# Patient Record
Sex: MALE | Race: WHITE | NOT HISPANIC OR LATINO | URBAN - METROPOLITAN AREA
[De-identification: names, ages, dates, MRNs, and addresses within clinical notes are randomized per-mention and may not be internally consistent; named-entity substitution may affect disease eponyms.]

---

## 2022-02-13 ENCOUNTER — EMERGENCY (EMERGENCY)
Facility: HOSPITAL | Age: 52
LOS: 0 days | Discharge: HOME | End: 2022-02-13
Attending: EMERGENCY MEDICINE
Payer: MEDICARE

## 2022-02-13 VITALS
OXYGEN SATURATION: 95 % | HEART RATE: 86 BPM | DIASTOLIC BLOOD PRESSURE: 76 MMHG | TEMPERATURE: 98 F | SYSTOLIC BLOOD PRESSURE: 154 MMHG | RESPIRATION RATE: 16 BRPM

## 2022-02-13 VITALS — WEIGHT: 205.03 LBS | HEIGHT: 71 IN

## 2022-02-13 DIAGNOSIS — S61.312A LACERATION WITHOUT FOREIGN BODY OF RIGHT MIDDLE FINGER WITH DAMAGE TO NAIL, INITIAL ENCOUNTER: ICD-10-CM

## 2022-02-13 DIAGNOSIS — S62.632A DISPLACED FRACTURE OF DISTAL PHALANX OF RIGHT MIDDLE FINGER, INITIAL ENCOUNTER FOR CLOSED FRACTURE: ICD-10-CM

## 2022-02-13 DIAGNOSIS — Z23 ENCOUNTER FOR IMMUNIZATION: ICD-10-CM
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
## 2022-02-13 DIAGNOSIS — Z87.820 PERSONAL HISTORY OF TRAUMATIC BRAIN INJURY: ICD-10-CM
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
## 2022-02-13 DIAGNOSIS — S69.91XA UNSPECIFIED INJURY OF RIGHT WRIST, HAND AND FINGER(S), INITIAL ENCOUNTER: ICD-10-CM

## 2022-02-13 DIAGNOSIS — M79.644 PAIN IN RIGHT FINGER(S): ICD-10-CM

## 2022-02-13 PROCEDURE — 90471 IMMUNIZATION ADMIN: CPT

## 2022-02-13 PROCEDURE — 96374 THER/PROPH/DIAG INJ IV PUSH: CPT | Mod: XU

## 2022-02-13 PROCEDURE — 90715 TDAP VACCINE 7 YRS/> IM: CPT

## 2022-02-13 PROCEDURE — 99284 EMERGENCY DEPT VISIT MOD MDM: CPT

## 2022-02-13 PROCEDURE — 11760 REPAIR OF NAIL BED: CPT | Mod: F7

## 2022-02-13 PROCEDURE — 99284 EMERGENCY DEPT VISIT MOD MDM: CPT | Mod: 25

## 2022-02-13 PROCEDURE — 73130 X-RAY EXAM OF HAND: CPT | Mod: RT

## 2022-02-13 PROCEDURE — 73130 X-RAY EXAM OF HAND: CPT | Mod: 26,RT

## 2022-02-13 RX ORDER — CEFAZOLIN SODIUM 1 G
1000 VIAL (EA) INJECTION ONCE
Refills: 0 | Status: COMPLETED | OUTPATIENT
Start: 2022-02-13 | End: 2022-02-13

## 2022-02-13 RX ORDER — SODIUM CHLORIDE 9 MG/ML
1000 INJECTION INTRAMUSCULAR; INTRAVENOUS; SUBCUTANEOUS ONCE
Refills: 0 | Status: COMPLETED | OUTPATIENT
Start: 2022-02-13 | End: 2022-02-13

## 2022-02-13 RX ORDER — OXYCODONE HYDROCHLORIDE 5 MG/1
10 TABLET ORAL ONCE
Refills: 0 | Status: DISCONTINUED | OUTPATIENT
Start: 2022-02-13 | End: 2022-02-13

## 2022-02-13 RX ORDER — CEPHALEXIN 500 MG
1 CAPSULE ORAL
Qty: 20 | Refills: 0
Start: 2022-02-13 | End: 2022-02-17

## 2022-02-13 RX ORDER — TETANUS TOXOID, REDUCED DIPHTHERIA TOXOID AND ACELLULAR PERTUSSIS VACCINE, ADSORBED 5; 2.5; 8; 8; 2.5 [IU]/.5ML; [IU]/.5ML; UG/.5ML; UG/.5ML; UG/.5ML
0.5 SUSPENSION INTRAMUSCULAR ONCE
Refills: 0 | Status: COMPLETED | OUTPATIENT
Start: 2022-02-13 | End: 2022-02-13

## 2022-02-13 RX ORDER — CEFAZOLIN SODIUM 1 G
1000 VIAL (EA) INJECTION ONCE
Refills: 0 | Status: DISCONTINUED | OUTPATIENT
Start: 2022-02-13 | End: 2022-02-13

## 2022-02-13 RX ADMIN — OXYCODONE HYDROCHLORIDE 10 MILLIGRAM(S): 5 TABLET ORAL at 17:14

## 2022-02-13 RX ADMIN — SODIUM CHLORIDE 1000 MILLILITER(S): 9 INJECTION INTRAMUSCULAR; INTRAVENOUS; SUBCUTANEOUS at 17:19

## 2022-02-13 RX ADMIN — TETANUS TOXOID, REDUCED DIPHTHERIA TOXOID AND ACELLULAR PERTUSSIS VACCINE, ADSORBED 0.5 MILLILITER(S): 5; 2.5; 8; 8; 2.5 SUSPENSION INTRAMUSCULAR at 17:19

## 2022-02-13 RX ADMIN — Medication 1000 MILLIGRAM(S): at 17:15

## 2022-02-13 NOTE — ED STATDOCS - CARE PLAN
1 Principal Discharge DX:	Finger fracture  Goal:	Tuft fracture   Principal Discharge DX:	Finger fracture  Goal:	Tuft fracture, seen by Dr. Manzo

## 2022-02-13 NOTE — ED STATDOCS - NSFOLLOWUPINSTRUCTIONS_ED_ALL_ED_FT
Please note that you were seen in the emergency room by the orthopedic team via Dr. Manzo. You have xray which showed that you have evidence of fracture in one of the bones of your right middle finger, but as accurate as xray is for evaluation of acute fractures, it can not comment on joint space and ligamental injury so please follow up with Dr. Manzo as soon as possible. I have provided you with the follow up for Dr. Manzo. Please return to us immediately if you have any worsening of the pain, fever, chills, infection at the nail, or nail bed or if you can not see an orthopedic specialist for hand in this case Dr. Manzo. If you have any chest pain, abdominal pain, palpitation, lightheadedness, nausea, vomiting, fever, redness in the finger or worsening pain in the finger you are to return to us immediately. For all other health concerns or if you have any emergencies return to us immediately.    ___________________      Finger Fracture    WHAT YOU NEED TO KNOW:    A finger fracture is a break in one or more of the bones in your finger.    DISCHARGE INSTRUCTIONS:    Return to the emergency department if:   •Your cast or splint gets wet, damaged, or comes off.      •Your splint or cast feels too tight.      •You have severe pain.      •Your injured finger is numb, cold, or pale.      Call your doctor or hand specialist if:   •Your pain or swelling gets worse, even after treatment.      •You have questions or concerns about your condition or care.      Medicines: You may need any of the following:   •NSAIDs, such as ibuprofen, help decrease swelling, pain, and fever. This medicine is available with or without a doctor's order. NSAIDs can cause stomach bleeding or kidney problems in certain people. If you take blood thinner medicine, always ask your healthcare provider if NSAIDs are safe for you. Always read the medicine label and follow directions.      •Acetaminophen decreases pain and fever. It is available without a doctor's order. Ask how much to take and how often to take it. Follow directions. Read the labels of all other medicines you are using to see if they also contain acetaminophen, or ask your doctor or pharmacist. Acetaminophen can cause liver damage if not taken correctly. Do not use more than 4 grams (4,000 milligrams) total of acetaminophen in one day.       •Prescription pain medicine may be given. Ask your healthcare provider how to take this medicine safely. Some prescription pain medicines contain acetaminophen. Do not take other medicines that contain acetaminophen without talking to your healthcare provider. Too much acetaminophen may cause liver damage. Prescription pain medicine may cause constipation. Ask your healthcare provider how to prevent or treat constipation.       •Take your medicine as directed. Contact your healthcare provider if you think your medicine is not helping or if you have side effects. Tell him or her if you are allergic to any medicine. Keep a list of the medicines, vitamins, and herbs you take. Include the amounts, and when and why you take them. Bring the list or the pill bottles to follow-up visits. Carry your medicine list with you in case of an emergency.      Self-care:   •Wear your splint as directed. Do not remove your splint until you follow up with your healthcare provider or hand specialist.      •Apply ice on your finger for 15 to 20 minutes every hour or as directed. Use an ice pack, or put crushed ice in a plastic bag. Cover it with a towel before you apply it to your skin. Ice helps prevent tissue damage and decreases swelling and pain.      •Elevate your finger above the level of your heart as often as you can. This will help decrease swelling and pain. Prop your hand on pillows or blankets to keep it elevated comfortably.             •Go to physical therapy as directed. A physical therapist teaches you exercises to help improve movement and strength, and to decrease pain.      Follow up with your doctor or hand specialist within 2 days: Write down your questions so you remember to ask them during your visits.    _____________      Finger Sprain    WHAT YOU NEED TO KNOW:    A finger sprain happens when ligaments in your finger or thumb are stretched or torn. Ligaments are the tough tissues that connect bones. Ligaments allow your hands to grasp and pinch.    DISCHARGE INSTRUCTIONS:    Return to the emergency department if:   •The skin on your injured finger looks bluish or pale (less color than normal).      •You have new weakness or numbness in your finger or thumb. It may tingle or burn.      •You have a splint that you cannot adjust and it feels too tight.      Call your doctor if:   •You have new or increased swelling or pain in your finger.      •You have new or increased stiffness when you move your injured finger.      •You have questions or concerns about your injury or treatment.      Medicines:   •Prescription pain medicine may be given. Ask your healthcare provider how to take this medicine safely. Some prescription pain medicines contain acetaminophen. Do not take other medicines that contain acetaminophen without talking to your healthcare provider. Too much acetaminophen may cause liver damage. Prescription pain medicine may cause constipation. Ask your healthcare provider how to prevent or treat constipation.       •Take your medicine as directed. Contact your healthcare provider if you think your medicine is not helping or if you have side effects. Tell him or her if you are allergic to any medicine. Keep a list of the medicines, vitamins, and herbs you take. Include the amounts, and when and why you take them. Bring the list or the pill bottles to follow-up visits. Carry your medicine list with you in case of an emergency.      Care for your finger:   •Rest your finger for at least 48 hours. Do not do activities that cause pain. Return to normal activities as directed.      •Apply ice on your finger to help decrease pain and swelling. Use an ice pack, or put crushed ice in a plastic bag. Cover the bag with a towel before you place it on your finger. Apply ice every hour for 15 to 20 minutes at a time. You may need to apply ice at least 4 to 8 times each day. Continue for as many days as directed.      •Elevate (raise) your finger above the level of your heart as often as you can. This will help decrease swelling and pain. You can elevate your hand by resting it on a pillow.             •Use a splint or compression as directed. Compression (tight hold) helps support your finger or thumb as it heals. Tape your injured finger to the finger beside it. Severe sprains may be treated with a splint. A splint prevents your finger from moving while it heals. Ask how long you must wear the splint or tape, and how to apply them.      •Do exercises as directed. You may be given gentle exercises to begin in a few days. Exercises can help decrease stiffness in your finger or thumb. Exercises also help decrease pain and swelling and improve the movement of your finger or thumb. Check with your healthcare provider before you return to your normal activities or sports.      Follow up with your doctor as directed: Write down any questions you may have to ask at your follow up visits. Please note that you were seen in the emergency room by the orthopedic team via Dr. Manzo. You have xray which showed that you have evidence of fracture in one of the bones of your right middle finger, but as accurate as xray is for evaluation of acute fractures, it can not comment on joint space and ligamental injury so please follow up with Dr. Manzo as soon as possible. I have provided you with the follow up for Dr. Manzo. Please return to us immediately if you have any worsening of the pain, fever, chills, infection at the nail, or nail bed or if you can not see an orthopedic specialist for hand in this case Dr. Manzo. If you have any chest pain, abdominal pain, palpitation, lightheadedness, nausea, vomiting, fever, redness in the finger or worsening pain in the finger you are to return to us immediately. For all other health concerns or if you have any emergencies return to us immediately.     ___________________      Finger Fracture    WHAT YOU NEED TO KNOW:    A finger fracture is a break in one or more of the bones in your finger.    DISCHARGE INSTRUCTIONS:    Return to the emergency department if:   •Your cast or splint gets wet, damaged, or comes off.      •Your splint or cast feels too tight.      •You have severe pain.      •Your injured finger is numb, cold, or pale.      Call your doctor or hand specialist if:   •Your pain or swelling gets worse, even after treatment.      •You have questions or concerns about your condition or care.      Medicines: You may need any of the following:   •NSAIDs, such as ibuprofen, help decrease swelling, pain, and fever. This medicine is available with or without a doctor's order. NSAIDs can cause stomach bleeding or kidney problems in certain people. If you take blood thinner medicine, always ask your healthcare provider if NSAIDs are safe for you. Always read the medicine label and follow directions.      •Acetaminophen decreases pain and fever. It is available without a doctor's order. Ask how much to take and how often to take it. Follow directions. Read the labels of all other medicines you are using to see if they also contain acetaminophen, or ask your doctor or pharmacist. Acetaminophen can cause liver damage if not taken correctly. Do not use more than 4 grams (4,000 milligrams) total of acetaminophen in one day.       •Prescription pain medicine may be given. Ask your healthcare provider how to take this medicine safely. Some prescription pain medicines contain acetaminophen. Do not take other medicines that contain acetaminophen without talking to your healthcare provider. Too much acetaminophen may cause liver damage. Prescription pain medicine may cause constipation. Ask your healthcare provider how to prevent or treat constipation.       •Take your medicine as directed. Contact your healthcare provider if you think your medicine is not helping or if you have side effects. Tell him or her if you are allergic to any medicine. Keep a list of the medicines, vitamins, and herbs you take. Include the amounts, and when and why you take them. Bring the list or the pill bottles to follow-up visits. Carry your medicine list with you in case of an emergency.      Self-care:   •Wear your splint as directed. Do not remove your splint until you follow up with your healthcare provider or hand specialist.      •Apply ice on your finger for 15 to 20 minutes every hour or as directed. Use an ice pack, or put crushed ice in a plastic bag. Cover it with a towel before you apply it to your skin. Ice helps prevent tissue damage and decreases swelling and pain.      •Elevate your finger above the level of your heart as often as you can. This will help decrease swelling and pain. Prop your hand on pillows or blankets to keep it elevated comfortably.             •Go to physical therapy as directed. A physical therapist teaches you exercises to help improve movement and strength, and to decrease pain.      Follow up with your doctor or hand specialist within 2 days: Write down your questions so you remember to ask them during your visits.    _____________      Finger Sprain    WHAT YOU NEED TO KNOW:    A finger sprain happens when ligaments in your finger or thumb are stretched or torn. Ligaments are the tough tissues that connect bones. Ligaments allow your hands to grasp and pinch.    DISCHARGE INSTRUCTIONS:    Return to the emergency department if:   •The skin on your injured finger looks bluish or pale (less color than normal).      •You have new weakness or numbness in your finger or thumb. It may tingle or burn.      •You have a splint that you cannot adjust and it feels too tight.      Call your doctor if:   •You have new or increased swelling or pain in your finger.      •You have new or increased stiffness when you move your injured finger.      •You have questions or concerns about your injury or treatment.      Medicines:   •Prescription pain medicine may be given. Ask your healthcare provider how to take this medicine safely. Some prescription pain medicines contain acetaminophen. Do not take other medicines that contain acetaminophen without talking to your healthcare provider. Too much acetaminophen may cause liver damage. Prescription pain medicine may cause constipation. Ask your healthcare provider how to prevent or treat constipation.       •Take your medicine as directed. Contact your healthcare provider if you think your medicine is not helping or if you have side effects. Tell him or her if you are allergic to any medicine. Keep a list of the medicines, vitamins, and herbs you take. Include the amounts, and when and why you take them. Bring the list or the pill bottles to follow-up visits. Carry your medicine list with you in case of an emergency.      Care for your finger:   •Rest your finger for at least 48 hours. Do not do activities that cause pain. Return to normal activities as directed.      •Apply ice on your finger to help decrease pain and swelling. Use an ice pack, or put crushed ice in a plastic bag. Cover the bag with a towel before you place it on your finger. Apply ice every hour for 15 to 20 minutes at a time. You may need to apply ice at least 4 to 8 times each day. Continue for as many days as directed.      •Elevate (raise) your finger above the level of your heart as often as you can. This will help decrease swelling and pain. You can elevate your hand by resting it on a pillow.             •Use a splint or compression as directed. Compression (tight hold) helps support your finger or thumb as it heals. Tape your injured finger to the finger beside it. Severe sprains may be treated with a splint. A splint prevents your finger from moving while it heals. Ask how long you must wear the splint or tape, and how to apply them.      •Do exercises as directed. You may be given gentle exercises to begin in a few days. Exercises can help decrease stiffness in your finger or thumb. Exercises also help decrease pain and swelling and improve the movement of your finger or thumb. Check with your healthcare provider before you return to your normal activities or sports.      Follow up with your doctor as directed: Write down any questions you may have to ask at your follow up visits.    _______________      Cephalexin (By mouth)  Cephalexin ([id number])    Treats infections. This medicine is a cephalosporin antibiotic.    Brand Name(s):Keflex  There may be other brand names for this medicine.    When This Medicine Should Not Be Used:  This medicine is not right for everyone. Do not use this medicine if you had an allergic reaction to cephalexin or another cephalosporin medicine.    How to Use This Medicine:  Capsule, Tablet, Tablet for Suspension, Liquid  •Your doctor will tell you how much medicine to use. Do not use more than directed.   •Read and follow the patient instructions that come with this medicine. Talk to your doctor or pharmacist if you have any questions.   •You may take your medicine with food or milk to avoid stomach upset.  •Oral liquid: Shake well just before use. Measure the oral liquid medicine with a marked measuring spoon, oral syringe, or medicine cup.   •Take all of the medicine in your prescription to clear up your infection, even if you feel better after the first few doses.   •Missed dose: Take a dose as soon as you remember. If it is almost time for your next dose, wait until then and take a regular dose. Do not take extra medicine to make up for a missed dose.   •Capsule or tablet: Store at room temperature away from heat, moisture, and direct light.  •Oral liquid: Store in the refrigerator for 14 days. After 14 days, throw away any unused medicine. Do not freeze.    Drugs and Foods to Avoid:  Ask your doctor or pharmacist before using any other medicine, including over-the-counter medicines, vitamins, and herbal products.  •Some medicines and foods can affect how cephalexin works. Tell your doctor if you are also using  ?Metformin  ?Probenecid      Warnings While Using This Medicine:  •Tell your doctor if you are pregnant or breastfeeding, or if you have kidney disease, liver disease, or a history of digestive problems, such as colitis. Tell your doctor if you are allergic to penicillin.  •This medicine can cause diarrhea. Call your doctor if the diarrhea becomes severe, does not stop, or is bloody. Do not take any medicine to stop diarrhea until you have talked to your doctor. Diarrhea can occur 2 months or more after you stop taking this medicine.   •Tell any doctor or dentist who treats you that you are using this medicine. This medicine may affect certain medical test results.   •Call your doctor if your symptoms do not improve or if they get worse.   •Keep all medicine out of the reach of children. Never share your medicine with anyone.     Possible Side Effects While Using This Medicine:  Call your doctor right away if you notice any of these side effects:  •Allergic reaction: Itching or hives, swelling in your face or hands, swelling or tingling in your mouth or throat, chest tightness, trouble breathing  •Blistering, peeling, red skin rash  •Severe diarrhea, especially if bloody or ongoing  •Severe stomach pain, vomiting    If you notice these less serious side effects, talk with your doctor:  •Mild diarrhea or nausea    If you notice other side effects that you think are caused by this medicine, tell your doctor.  Call your doctor for medical advice about side effects. You may report side effects to FDA at 2-145-MIB-7368

## 2022-02-13 NOTE — CONSULT NOTE ADULT - SUBJECTIVE AND OBJECTIVE BOX
59y Male RHD presents c/o middle finger nail bed injury from sticking his hand in a snowblower. States he has sensation in the distal phalanx. States he was able to Flex/Extend finger. Denies other trauma. No other complaints.    Got Ancef in ED and updated Tetanus.      Allergies    No Known Allergies    Intolerances        Imaging: XR demonstrates Distal phalanx fracture of long digit.    Vital Signs Last 24 Hrs  T(C): 36.5 (02-13-22 @ 16:30), Max: 36.5 (02-13-22 @ 16:30)  T(F): 97.7 (02-13-22 @ 16:30), Max: 97.7 (02-13-22 @ 16:30)  HR: 86 (02-13-22 @ 16:30) (86 - 86)  BP: 154/76 (02-13-22 @ 16:30) (154/76 - 154/76)  BP(mean): --  RR: 16 (02-13-22 @ 16:30) (16 - 16)  SpO2: 95% (02-13-22 @ 16:30) (95% - 95%)    Physical Exam  Gen: NAD  RUE: No laceration through nail and but upon removal of nail, noted laceration involving nail bed of right long digit, no exposed bone, SILT radial and ulnar side of digit, cap refill brisk    Procedure: 10cc 1% lidocaine injected under sterile procedure for digit block. Sterile prep with betadine and sterile drape. Remaining nail removed and nail bed repaired with 5-0 chromic gut.  Remaining nail sutured back under cuticle to allow track for new nail to grow. Paced in well padded dressing. Tolerated procedure well.     A/P: 59y Male with R Long finger distal phalanx fx with associate nail bed laceration    Pain control  NWB R Long finger in dry dressing, keep c/d/I  Ice/elevation   Active movement of fingers encouraged  antibiotics x 5 days- keflex 500mg q6hr (for infection ppx)  Possible loss of nail w/ cosmetic deformity d/w pt  All question answered  Follow up with Dr. Manzo as outpatient on Thursday, call office on Monday for appointment   Ortho stable for DC  Discussed with Dr. Manzo who agrees with above plan

## 2022-02-13 NOTE — ED STATDOCS - ATTENDING CONTRIBUTION TO CARE
I Jae Sun MD saw and examined the patient. MLP saw and examined the patient under my supervision. I discussed the care of the patient with MLP and agree with MLP's plan, assessment and care of the patient while in the ED.

## 2022-02-13 NOTE — ED ADULT TRIAGE NOTE - CHIEF COMPLAINT QUOTE
pt. presents to ED c/o right middle finger laceration. pt. placed finger into snowblower and cut finger. laceration noted to the top of the nailbed at cuticle. dressing applied PTA. bleeding controlled. pt. reports numbness and tingling.

## 2022-02-13 NOTE — ED STATDOCS - PATIENT PORTAL LINK FT
You can access the FollowMyHealth Patient Portal offered by [facility] by registering at the following website: [email or web address]. By joining [email or web address]’s FollowMyHealth portal, you will also be able to view your health information using other applications (apps) compatible with our system.

## 2022-02-13 NOTE — ED STATDOCS - CHPI ED SYMPTOM NEG
no burning urination/no fever/no hematuria/no nausea/no abdominal distention/no blood in stool/no diarrhea/no dysuria/no vomiting/no palpitations

## 2022-02-13 NOTE — ED STATDOCS - NEUROLOGICAL, MLM
sensation is normal and strength is normal. sensation is normal and strength is normal. CNs 2-12 intact. NIH=0 GCS=15. No saddle anesthesia.

## 2022-02-13 NOTE — ED STATDOCS - CARE PROVIDER_API CALL
Isaiah Manzo)  Orthopaedic Surgery; Surgery of the Hand  [address] [place] [address]  Phone: [phone]  Fax: [phone]  Follow Up Time:

## 2022-02-13 NOTE — ED STATDOCS - RESPIRATORY, MLM
breath sounds clear and equal bilaterally. breath sounds clear and equal bilaterally. No retraction or tachypnea.

## 2022-02-13 NOTE — ED STATDOCS - OBJECTIVE STATEMENT
52 M hx TBI, chronic back pain here c/o R 3rd digit injury. pt was using  and put finger into the , injuring finger. no other injuries. denies hx of HLD, HTN, DM. pt reports he has chronic back pain and takes opiates and took a dose before coming in to the ED, but can not recall the name of the medication he took. NKDA. 52 M hx of TBI, chronic back pain here c/o R 3rd digit injury. pt was using  and put finger into the , injuring finger. no other injuries. denies hx of HLD, HTN, DM. pt reports he has chronic back pain and takes opiates and took a dose before coming in to the ED, but can not recall the name of the medication he took. NKDA. No amputation of the digit but has laceration and injury, difficulty bending the tip of the finger. no fever or chills. No discharge. No visual or focal neurological complaints. No abdominal pain. No chest pain sob or palpitation.

## 2022-02-13 NOTE — ED STATDOCS - PROGRESS NOTE DETAILS
Ba Sun : updated pt and family on results for X ray. orthopedic team via Dr. Manzo in the ED, as per orthopedic evaluation. 53 y/o Male presents to ED c/o cutting Right 3rd finger this afternoon when he got fingertip caught in his snowblower.  Nail is pulled out of nailbed per pt.  Pt also having difficulty extending fingertip.  Xray with finger tuft fracture.  Consulted Dr. Manzo (Hand) for open finger Fx.  IV abx started.  Sujey Mccallum PA-C Ba Sun : updated pt and family on results for X ray. orthopedic team via Dr. Manzo in the ED, plan as per orthopedic evaluation.

## 2022-02-13 NOTE — ED ADULT NURSE NOTE - OBJECTIVE STATEMENT
pt presents to er for evaluation of injury to r middle finger while using . bleeding controlled w 4x4.

## 2022-02-13 NOTE — ED STATDOCS - MUSCULOSKELETAL, MLM
+ TTP of the R middle finger, visible deformity at the DIP vs. distal phalanx, nail bed exposed with laceration, intact sensation but unable to flex at DIP and PIP due to pain, cap refill < 3 seconds, otherwise all the other extremities and digits NVI

## 2022-02-13 NOTE — ED STATDOCS - CONSTITUTIONAL, MLM
well appearing and in no apparent distress. normal... well appearing and in no apparent distress. Nontoxic appearing. AAOX4.

## 2022-02-13 NOTE — ED STATDOCS - NS_ ATTENDINGSCRIBEDETAILS _ED_A_ED_FT
I Jae Sun MD saw and examined the patient. Scribe documented for me and under my supervision. I have modified the scribe's documentation where necessary to reflect my history, physical exam and other relevant documentations pertinent to the care of the patient.